# Patient Record
Sex: MALE | Race: WHITE | NOT HISPANIC OR LATINO | Employment: FULL TIME | URBAN - METROPOLITAN AREA
[De-identification: names, ages, dates, MRNs, and addresses within clinical notes are randomized per-mention and may not be internally consistent; named-entity substitution may affect disease eponyms.]

---

## 2024-02-01 ENCOUNTER — LAB (OUTPATIENT)
Dept: LAB | Facility: LAB | Age: 44
End: 2024-02-01
Payer: COMMERCIAL

## 2024-02-01 ENCOUNTER — OFFICE VISIT (OUTPATIENT)
Dept: PRIMARY CARE | Facility: CLINIC | Age: 44
End: 2024-02-01
Payer: COMMERCIAL

## 2024-02-01 ENCOUNTER — APPOINTMENT (OUTPATIENT)
Dept: RADIOLOGY | Facility: CLINIC | Age: 44
End: 2024-02-01
Payer: COMMERCIAL

## 2024-02-01 VITALS
HEIGHT: 68 IN | WEIGHT: 248.1 LBS | BODY MASS INDEX: 37.6 KG/M2 | DIASTOLIC BLOOD PRESSURE: 87 MMHG | OXYGEN SATURATION: 98 % | SYSTOLIC BLOOD PRESSURE: 137 MMHG | HEART RATE: 76 BPM | TEMPERATURE: 96.8 F

## 2024-02-01 DIAGNOSIS — R10.31 ACUTE RIGHT LOWER QUADRANT PAIN: ICD-10-CM

## 2024-02-01 DIAGNOSIS — R10.31 ACUTE RIGHT LOWER QUADRANT PAIN: Primary | ICD-10-CM

## 2024-02-01 PROBLEM — I10 BENIGN ESSENTIAL HYPERTENSION: Status: ACTIVE | Noted: 2024-02-01

## 2024-02-01 PROBLEM — E78.1 HYPERTRIGLYCERIDEMIA: Status: ACTIVE | Noted: 2024-02-01

## 2024-02-01 PROBLEM — R73.01 IMPAIRED FASTING GLUCOSE: Status: ACTIVE | Noted: 2024-02-01

## 2024-02-01 PROBLEM — G47.10 HYPERSOMNIA: Status: ACTIVE | Noted: 2024-02-01

## 2024-02-01 LAB
ALBUMIN SERPL BCP-MCNC: 5 G/DL (ref 3.4–5)
ALP SERPL-CCNC: 99 U/L (ref 33–120)
ALT SERPL W P-5'-P-CCNC: 70 U/L (ref 10–52)
ANION GAP SERPL CALC-SCNC: 15 MMOL/L (ref 10–20)
AST SERPL W P-5'-P-CCNC: 36 U/L (ref 9–39)
BASOPHILS # BLD AUTO: 0.04 X10*3/UL (ref 0–0.1)
BASOPHILS NFR BLD AUTO: 0.6 %
BILIRUB SERPL-MCNC: 0.8 MG/DL (ref 0–1.2)
BUN SERPL-MCNC: 13 MG/DL (ref 6–23)
CALCIUM SERPL-MCNC: 9.9 MG/DL (ref 8.6–10.6)
CHLORIDE SERPL-SCNC: 104 MMOL/L (ref 98–107)
CO2 SERPL-SCNC: 25 MMOL/L (ref 21–32)
CREAT SERPL-MCNC: 0.89 MG/DL (ref 0.5–1.3)
EGFRCR SERPLBLD CKD-EPI 2021: >90 ML/MIN/1.73M*2
EOSINOPHIL # BLD AUTO: 0.17 X10*3/UL (ref 0–0.7)
EOSINOPHIL NFR BLD AUTO: 2.4 %
ERYTHROCYTE [DISTWIDTH] IN BLOOD BY AUTOMATED COUNT: 12.2 % (ref 11.5–14.5)
GLUCOSE SERPL-MCNC: 99 MG/DL (ref 74–99)
HCT VFR BLD AUTO: 48.4 % (ref 41–52)
HGB BLD-MCNC: 16.7 G/DL (ref 13.5–17.5)
IMM GRANULOCYTES # BLD AUTO: 0.03 X10*3/UL (ref 0–0.7)
IMM GRANULOCYTES NFR BLD AUTO: 0.4 % (ref 0–0.9)
LYMPHOCYTES # BLD AUTO: 2.38 X10*3/UL (ref 1.2–4.8)
LYMPHOCYTES NFR BLD AUTO: 33.7 %
MCH RBC QN AUTO: 29.8 PG (ref 26–34)
MCHC RBC AUTO-ENTMCNC: 34.5 G/DL (ref 32–36)
MCV RBC AUTO: 86 FL (ref 80–100)
MONOCYTES # BLD AUTO: 0.66 X10*3/UL (ref 0.1–1)
MONOCYTES NFR BLD AUTO: 9.3 %
NEUTROPHILS # BLD AUTO: 3.79 X10*3/UL (ref 1.2–7.7)
NEUTROPHILS NFR BLD AUTO: 53.6 %
NRBC BLD-RTO: 0 /100 WBCS (ref 0–0)
PLATELET # BLD AUTO: 256 X10*3/UL (ref 150–450)
POTASSIUM SERPL-SCNC: 4.2 MMOL/L (ref 3.5–5.3)
PROT SERPL-MCNC: 7.7 G/DL (ref 6.4–8.2)
RBC # BLD AUTO: 5.6 X10*6/UL (ref 4.5–5.9)
SODIUM SERPL-SCNC: 140 MMOL/L (ref 136–145)
WBC # BLD AUTO: 7.1 X10*3/UL (ref 4.4–11.3)

## 2024-02-01 PROCEDURE — 99213 OFFICE O/P EST LOW 20 MIN: CPT | Performed by: INTERNAL MEDICINE

## 2024-02-01 PROCEDURE — 3079F DIAST BP 80-89 MM HG: CPT | Performed by: INTERNAL MEDICINE

## 2024-02-01 PROCEDURE — 3075F SYST BP GE 130 - 139MM HG: CPT | Performed by: INTERNAL MEDICINE

## 2024-02-01 PROCEDURE — 36415 COLL VENOUS BLD VENIPUNCTURE: CPT

## 2024-02-01 PROCEDURE — 85025 COMPLETE CBC W/AUTO DIFF WBC: CPT

## 2024-02-01 PROCEDURE — 80053 COMPREHEN METABOLIC PANEL: CPT

## 2024-02-01 PROCEDURE — 1036F TOBACCO NON-USER: CPT | Performed by: INTERNAL MEDICINE

## 2024-02-01 ASSESSMENT — ENCOUNTER SYMPTOMS
CONSTIPATION: 0
CHILLS: 0
NAUSEA: 0
FEVER: 0
ABDOMINAL PAIN: 1
COUGH: 0
BACK PAIN: 0
DYSURIA: 0
HEMATURIA: 0
SHORTNESS OF BREATH: 0
ARTHRALGIAS: 0
PALPITATIONS: 0
DIARRHEA: 0
VOMITING: 0

## 2024-02-01 ASSESSMENT — PATIENT HEALTH QUESTIONNAIRE - PHQ9
10. IF YOU CHECKED OFF ANY PROBLEMS, HOW DIFFICULT HAVE THESE PROBLEMS MADE IT FOR YOU TO DO YOUR WORK, TAKE CARE OF THINGS AT HOME, OR GET ALONG WITH OTHER PEOPLE: NOT DIFFICULT AT ALL
1. LITTLE INTEREST OR PLEASURE IN DOING THINGS: SEVERAL DAYS
SUM OF ALL RESPONSES TO PHQ9 QUESTIONS 1 AND 2: 2
2. FEELING DOWN, DEPRESSED OR HOPELESS: SEVERAL DAYS

## 2024-02-01 NOTE — PROGRESS NOTES
"CHIEF COMPLAINT  Pain (Abdomen right side)    HISTORY OF PRESENT ILLNESS  Dean Arthur is a 43 y.o. male presents today for follow up of Pain (Abdomen right side)    HPI    Sunday night, started with crippling RLQ pain.  Today it's not as bad.  Felt like prior diverticulitis.  No fever, chills, nausea, vomiting, diarrhea, constipation.  Did not take anything for it, just drank a lot of water.  No blood in urine, no dysuria.  Slept in chair, just had crippling pain.   Left hemicolectomy in 2022.   Has had normal BM since the pain has started.  Pain is worse with deep breath.  Still has it now, just not as severe.    REVIEW OF SYSTEMS  Review of Systems   Constitutional:  Negative for chills and fever.   Respiratory:  Negative for cough and shortness of breath.    Cardiovascular:  Negative for chest pain, palpitations and leg swelling.   Gastrointestinal:  Positive for abdominal pain. Negative for constipation, diarrhea, nausea and vomiting.   Genitourinary:  Negative for dysuria and hematuria.   Musculoskeletal:  Negative for arthralgias, back pain and gait problem.       ALLERGIES  Patient has no known allergies.    MEDICATIONS  No current outpatient medications    TOBACCO USE  Social History     Tobacco Use   Smoking Status Never   Smokeless Tobacco Never       DEPRESSION SCREEN  Over the past 2 weeks, how often have you been bothered by any of the following problems?  Little interest or pleasure in doing things: Several days  Feeling down, depressed, or hopeless: Several days    SURGICAL HISTORY  Past Surgical History:  2023: CARPAL TUNNEL RELEASE; Bilateral      Comment:  done 6 weeks apart  08/24/2021: OTHER SURGICAL HISTORY      Comment:  Complete colonoscopy  08/30/2022: OTHER SURGICAL HISTORY      Comment:  Laparoscopic left hemicolectomy       OBJECTIVE    /87   Pulse 76   Temp 36 °C (96.8 °F)   Ht 1.727 m (5' 8\")   Wt 113 kg (248 lb 1.6 oz)   SpO2 98%   BMI 37.72 kg/m²    BMI: Estimated " "body mass index is 37.72 kg/m² as calculated from the following:    Height as of this encounter: 1.727 m (5' 8\").    Weight as of this encounter: 113 kg (248 lb 1.6 oz).    BP Readings from Last 3 Encounters:   02/01/24 137/87   11/10/22 140/89   08/30/22 135/88      Wt Readings from Last 3 Encounters:   02/01/24 113 kg (248 lb 1.6 oz)   03/10/23 111 kg (245 lb)   03/02/23 111 kg (244 lb 11.4 oz)        PHYSICAL EXAM  Physical Exam  Constitutional:       Appearance: Normal appearance. He is obese.   Cardiovascular:      Rate and Rhythm: Normal rate and regular rhythm.      Heart sounds: No murmur heard.  Pulmonary:      Effort: No respiratory distress.      Breath sounds: Normal breath sounds. No wheezing.      Comments: Pain in RLQ with deep breath  Abdominal:      General: There is no distension.      Palpations: Abdomen is soft.      Comments: +Tenderness RLQ, no rebound/guarding   Neurological:      General: No focal deficit present.      Mental Status: He is alert and oriented to person, place, and time. Mental status is at baseline.   Psychiatric:         Mood and Affect: Mood normal.         Behavior: Behavior normal.         Thought Content: Thought content normal.         Judgment: Judgment normal.       ASSESSMENT AND PLAN  Assessment/Plan   Problem List Items Addressed This Visit    None  Visit Diagnoses       Acute right lower quadrant pain    -  Primary    Relevant Orders    CBC and Auto Differential    Comprehensive Metabolic Panel    CT abdomen pelvis w IV contrast          Acute RLQ pain - patient states it feels like prior diverticulitis.  Possible causes include appendicitis, diverticulitis, or possible kidney stone.  CT A/P ordered, as well as CBC, CMP.  Follow-up pending results.   "

## 2024-02-02 ENCOUNTER — HOSPITAL ENCOUNTER (OUTPATIENT)
Dept: RADIOLOGY | Facility: HOSPITAL | Age: 44
Discharge: HOME | End: 2024-02-02
Payer: COMMERCIAL

## 2024-02-02 DIAGNOSIS — R10.31 ACUTE RIGHT LOWER QUADRANT PAIN: ICD-10-CM

## 2024-02-02 DIAGNOSIS — K63.89 EPIPLOIC APPENDAGITIS: Primary | ICD-10-CM

## 2024-02-02 PROCEDURE — 74177 CT ABD & PELVIS W/CONTRAST: CPT | Performed by: RADIOLOGY

## 2024-02-02 PROCEDURE — 74177 CT ABD & PELVIS W/CONTRAST: CPT

## 2024-02-02 PROCEDURE — 2550000001 HC RX 255 CONTRASTS: Performed by: INTERNAL MEDICINE

## 2024-02-02 RX ORDER — IBUPROFEN 600 MG/1
600 TABLET ORAL EVERY 8 HOURS
Qty: 21 TABLET | Refills: 0 | Status: SHIPPED | OUTPATIENT
Start: 2024-02-02 | End: 2024-02-09

## 2024-02-02 RX ADMIN — IOHEXOL 75 ML: 350 INJECTION, SOLUTION INTRAVENOUS at 13:11

## 2024-07-11 ENCOUNTER — OFFICE VISIT (OUTPATIENT)
Dept: PRIMARY CARE | Facility: CLINIC | Age: 44
End: 2024-07-11
Payer: COMMERCIAL

## 2024-07-11 VITALS
DIASTOLIC BLOOD PRESSURE: 83 MMHG | TEMPERATURE: 97.2 F | SYSTOLIC BLOOD PRESSURE: 128 MMHG | BODY MASS INDEX: 37.57 KG/M2 | OXYGEN SATURATION: 95 % | HEART RATE: 77 BPM | WEIGHT: 247.9 LBS | HEIGHT: 68 IN

## 2024-07-11 DIAGNOSIS — G89.29 CHRONIC PAIN OF RIGHT KNEE: Primary | ICD-10-CM

## 2024-07-11 DIAGNOSIS — M76.51 PATELLAR TENDINITIS OF RIGHT KNEE: ICD-10-CM

## 2024-07-11 DIAGNOSIS — M25.561 CHRONIC PAIN OF RIGHT KNEE: Primary | ICD-10-CM

## 2024-07-11 PROCEDURE — 3079F DIAST BP 80-89 MM HG: CPT | Performed by: INTERNAL MEDICINE

## 2024-07-11 PROCEDURE — 1036F TOBACCO NON-USER: CPT | Performed by: INTERNAL MEDICINE

## 2024-07-11 PROCEDURE — 3074F SYST BP LT 130 MM HG: CPT | Performed by: INTERNAL MEDICINE

## 2024-07-11 PROCEDURE — 99213 OFFICE O/P EST LOW 20 MIN: CPT | Performed by: INTERNAL MEDICINE

## 2024-07-11 ASSESSMENT — ENCOUNTER SYMPTOMS
ARTHRALGIAS: 1
CHILLS: 0
JOINT SWELLING: 0
FEVER: 0

## 2024-07-11 ASSESSMENT — PATIENT HEALTH QUESTIONNAIRE - PHQ9
1. LITTLE INTEREST OR PLEASURE IN DOING THINGS: NOT AT ALL
2. FEELING DOWN, DEPRESSED OR HOPELESS: NOT AT ALL
SUM OF ALL RESPONSES TO PHQ9 QUESTIONS 1 AND 2: 0
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
## 2024-08-27 ENCOUNTER — TELEPHONE (OUTPATIENT)
Dept: PRIMARY CARE | Facility: CLINIC | Age: 44
End: 2024-08-27
Payer: COMMERCIAL

## 2024-08-27 NOTE — TELEPHONE ENCOUNTER
Pt is calling to say he has a headache, sore throat, last night felt like throat was closing.  Also body aches and tired.  wife said he felt warm.    Started Sunday evening.    He wanted to come in today.......        [id number] - [place], [place] - [address] [place] [address]  [address] [facility] [address]  Phone: [phone] Fax: [phone]

## 2024-08-28 ENCOUNTER — OFFICE VISIT (OUTPATIENT)
Dept: PRIMARY CARE | Facility: CLINIC | Age: 44
End: 2024-08-28
Payer: COMMERCIAL

## 2024-08-28 VITALS
HEART RATE: 78 BPM | HEIGHT: 68 IN | DIASTOLIC BLOOD PRESSURE: 92 MMHG | WEIGHT: 245.2 LBS | BODY MASS INDEX: 37.16 KG/M2 | OXYGEN SATURATION: 98 % | SYSTOLIC BLOOD PRESSURE: 138 MMHG | TEMPERATURE: 97.2 F

## 2024-08-28 DIAGNOSIS — J06.9 UPPER RESPIRATORY TRACT INFECTION, UNSPECIFIED TYPE: ICD-10-CM

## 2024-08-28 DIAGNOSIS — J02.9 PHARYNGITIS, UNSPECIFIED ETIOLOGY: Primary | ICD-10-CM

## 2024-08-28 DIAGNOSIS — G89.29 CHRONIC PAIN OF RIGHT KNEE: ICD-10-CM

## 2024-08-28 DIAGNOSIS — R05.1 ACUTE COUGH: ICD-10-CM

## 2024-08-28 DIAGNOSIS — M76.51 PATELLAR TENDINITIS OF RIGHT KNEE: ICD-10-CM

## 2024-08-28 DIAGNOSIS — M25.561 CHRONIC PAIN OF RIGHT KNEE: ICD-10-CM

## 2024-08-28 DIAGNOSIS — R52 BODY ACHES: ICD-10-CM

## 2024-08-28 LAB
POC BINAX EXPIRATION: NORMAL
POC BINAX NOW COVID SERIAL NUMBER: NORMAL
POC RAPID INFLUENZA A: NEGATIVE
POC RAPID INFLUENZA B: NEGATIVE
POC RAPID STREP: NEGATIVE
POC SARS-COV-2 AG BINAX: NORMAL

## 2024-08-28 PROCEDURE — 1036F TOBACCO NON-USER: CPT | Performed by: NURSE PRACTITIONER

## 2024-08-28 PROCEDURE — 87804 INFLUENZA ASSAY W/OPTIC: CPT | Performed by: NURSE PRACTITIONER

## 2024-08-28 PROCEDURE — 87811 SARS-COV-2 COVID19 W/OPTIC: CPT | Performed by: NURSE PRACTITIONER

## 2024-08-28 PROCEDURE — 87880 STREP A ASSAY W/OPTIC: CPT | Performed by: NURSE PRACTITIONER

## 2024-08-28 PROCEDURE — 3075F SYST BP GE 130 - 139MM HG: CPT | Performed by: NURSE PRACTITIONER

## 2024-08-28 PROCEDURE — 3080F DIAST BP >= 90 MM HG: CPT | Performed by: NURSE PRACTITIONER

## 2024-08-28 PROCEDURE — 3008F BODY MASS INDEX DOCD: CPT | Performed by: NURSE PRACTITIONER

## 2024-08-28 PROCEDURE — 99213 OFFICE O/P EST LOW 20 MIN: CPT | Performed by: NURSE PRACTITIONER

## 2024-08-28 RX ORDER — BENZONATATE 200 MG/1
200 CAPSULE ORAL 3 TIMES DAILY PRN
Qty: 42 CAPSULE | Refills: 0 | Status: SHIPPED | OUTPATIENT
Start: 2024-08-28 | End: 2024-09-27

## 2024-08-28 ASSESSMENT — ENCOUNTER SYMPTOMS
CHILLS: 0
FEVER: 0
SINUS PAIN: 0
SORE THROAT: 1
FACIAL SWELLING: 0
DIARRHEA: 0
COUGH: 1
DIAPHORESIS: 0
RHINORRHEA: 0
CONSTIPATION: 0
FATIGUE: 1
ADENOPATHY: 1
APPETITE CHANGE: 1
VOMITING: 0
ACTIVITY CHANGE: 0
SHORTNESS OF BREATH: 0
SINUS PRESSURE: 0
NAUSEA: 0

## 2024-08-28 ASSESSMENT — PATIENT HEALTH QUESTIONNAIRE - PHQ9
SUM OF ALL RESPONSES TO PHQ9 QUESTIONS 1 AND 2: 2
1. LITTLE INTEREST OR PLEASURE IN DOING THINGS: SEVERAL DAYS
10. IF YOU CHECKED OFF ANY PROBLEMS, HOW DIFFICULT HAVE THESE PROBLEMS MADE IT FOR YOU TO DO YOUR WORK, TAKE CARE OF THINGS AT HOME, OR GET ALONG WITH OTHER PEOPLE: SOMEWHAT DIFFICULT
2. FEELING DOWN, DEPRESSED OR HOPELESS: SEVERAL DAYS

## 2024-08-28 NOTE — PATIENT INSTRUCTIONS
Start Mucinex DM for cough and mucous   Start zyrtec daily   Cepacol lozenges/ Chloraseptic spray for sore throat   Tessalon perles as needed for dry bothersome cough    
DISCHARGE

## 2024-08-28 NOTE — PROGRESS NOTES
Chief Complaint  Headache, Sore Throat, and Generalized Body Aches.    History Of Present Illness  Dean Arthur is a 44 y.o. male presents today for follow up of Headache, Sore Throat, and Generalized Body Aches.      Symptoms started Sunday 8/25- headaches, sore throat, bodyaches, productve cough- with green/yellow sputum at times white.     Denies chills, n/v. Denies nasal congestion, ear pain., no rash.     Appetite is decreased.  Drinking adequate fluids     Has tried theraflu at home.       Review of Systems  Review of Systems   Constitutional:  Positive for appetite change and fatigue. Negative for activity change, chills, diaphoresis and fever.   HENT:  Positive for sore throat. Negative for congestion, ear discharge, ear pain, facial swelling, hearing loss, mouth sores, nosebleeds, postnasal drip, rhinorrhea, sinus pressure and sinus pain.    Respiratory:  Positive for cough. Negative for shortness of breath.    Cardiovascular:  Negative for chest pain.   Gastrointestinal:  Negative for constipation, diarrhea, nausea and vomiting.   Hematological:  Positive for adenopathy (submandibular).       Past Medical History  He has a past medical history of Elevation of levels of liver transaminase levels (05/26/2015), Encounter for examination of ears and hearing without abnormal findings (01/26/2018), Encounter for immunization (12/23/2015), Laceration without foreign body of right middle finger without damage to nail, subsequent encounter (04/06/2020), Other conditions influencing health status (06/12/2019), Other specified anxiety disorders (01/12/2016), Other specified health status (01/12/2016), Pain in left shoulder (07/03/2019), Pain in unspecified shoulder, Personal history of diseases of the skin and subcutaneous tissue (04/05/2018), Personal history of other (healed) physical injury and trauma (02/24/2021), Personal history of other diseases of the digestive system (11/11/2021), Personal history of  "other diseases of the musculoskeletal system and connective tissue, Personal history of other diseases of the musculoskeletal system and connective tissue (05/26/2015), Personal history of other diseases of the musculoskeletal system and connective tissue (07/22/2015), Personal history of other diseases of the musculoskeletal system and connective tissue (06/19/2019), Personal history of other diseases of the musculoskeletal system and connective tissue (05/14/2015), Personal history of other diseases of the respiratory system (12/08/2017), Personal history of other specified conditions (08/04/2015), Shortness of breath (03/16/2017), and Unspecified hearing loss, bilateral (01/18/2018).    Surgical History  He has a past surgical history that includes Other surgical history (08/24/2021); Other surgical history (08/30/2022); and Carpal tunnel release (Bilateral, 2023).    Family History  Family History   Problem Relation Name Age of Onset    Other (glioblastoma multiforme) Mother      Bell's palsy Father      Diabetes type II Father      Gout Father      Hypertension Father      Heart attack Father          Social History  He reports that he has never smoked. He has never used smokeless tobacco. No history on file for alcohol use and drug use.    DEPRESSION SCREEN  Over the past 2 weeks, how often have you been bothered by any of the following problems?  Little interest or pleasure in doing things: Several days  Feeling down, depressed, or hopeless: Several days      Allergies  Patient has no known allergies.    Medications  Current Outpatient Medications   Medication Instructions    benzonatate (TESSALON) 200 mg, oral, 3 times daily PRN, Do not crush or chew.        Objective   BP (!) 138/92   Pulse 78   Temp 36.2 °C (97.2 °F)   Ht 1.727 m (5' 8\")   Wt 111 kg (245 lb 3.2 oz)   SpO2 98%   BMI 37.28 kg/m²    BMI: Estimated body mass index is 37.28 kg/m² as calculated from the following:    Height as of this " "encounter: 1.727 m (5' 8\").    Weight as of this encounter: 111 kg (245 lb 3.2 oz).    Physical Exam  Physical Exam  Constitutional:       Appearance: Normal appearance.   HENT:      Head: Normocephalic.      Mouth/Throat:      Mouth: Mucous membranes are moist.      Pharynx: Posterior oropharyngeal erythema present. No oropharyngeal exudate or uvula swelling.   Neurological:      Mental Status: He is alert.         Relevant Results and Imaging  Lab on 02/01/2024   Component Date Value Ref Range Status    WBC 02/01/2024 7.1  4.4 - 11.3 x10*3/uL Final    nRBC 02/01/2024 0.0  0.0 - 0.0 /100 WBCs Final    RBC 02/01/2024 5.60  4.50 - 5.90 x10*6/uL Final    Hemoglobin 02/01/2024 16.7  13.5 - 17.5 g/dL Final    Hematocrit 02/01/2024 48.4  41.0 - 52.0 % Final    MCV 02/01/2024 86  80 - 100 fL Final    MCH 02/01/2024 29.8  26.0 - 34.0 pg Final    MCHC 02/01/2024 34.5  32.0 - 36.0 g/dL Final    RDW 02/01/2024 12.2  11.5 - 14.5 % Final    Platelets 02/01/2024 256  150 - 450 x10*3/uL Final    Neutrophils % 02/01/2024 53.6  40.0 - 80.0 % Final    Immature Granulocytes %, Automated 02/01/2024 0.4  0.0 - 0.9 % Final    Immature Granulocyte Count (IG) includes promyelocytes, myelocytes and metamyelocytes but does not include bands. Percent differential counts (%) should be interpreted in the context of the absolute cell counts (cells/UL).    Lymphocytes % 02/01/2024 33.7  13.0 - 44.0 % Final    Monocytes % 02/01/2024 9.3  2.0 - 10.0 % Final    Eosinophils % 02/01/2024 2.4  0.0 - 6.0 % Final    Basophils % 02/01/2024 0.6  0.0 - 2.0 % Final    Neutrophils Absolute 02/01/2024 3.79  1.20 - 7.70 x10*3/uL Final    Percent differential counts (%) should be interpreted in the context of the absolute cell counts (cells/uL).    Immature Granulocytes Absolute, Au* 02/01/2024 0.03  0.00 - 0.70 x10*3/uL Final    Lymphocytes Absolute 02/01/2024 2.38  1.20 - 4.80 x10*3/uL Final    Monocytes Absolute 02/01/2024 0.66  0.10 - 1.00 x10*3/uL Final    " Eosinophils Absolute 02/01/2024 0.17  0.00 - 0.70 x10*3/uL Final    Basophils Absolute 02/01/2024 0.04  0.00 - 0.10 x10*3/uL Final    Glucose 02/01/2024 99  74 - 99 mg/dL Final    Sodium 02/01/2024 140  136 - 145 mmol/L Final    Potassium 02/01/2024 4.2  3.5 - 5.3 mmol/L Final    Chloride 02/01/2024 104  98 - 107 mmol/L Final    Bicarbonate 02/01/2024 25  21 - 32 mmol/L Final    Anion Gap 02/01/2024 15  10 - 20 mmol/L Final    Urea Nitrogen 02/01/2024 13  6 - 23 mg/dL Final    Creatinine 02/01/2024 0.89  0.50 - 1.30 mg/dL Final    eGFR 02/01/2024 >90  >60 mL/min/1.73m*2 Final    Calculations of estimated GFR are performed using the 2021 CKD-EPI Study Refit equation without the race variable for the IDMS-Traceable creatinine methods.  https://jasn.asnjournals.org/content/early/2021/09/22/ASN.2535291609    Calcium 02/01/2024 9.9  8.6 - 10.6 mg/dL Final    Albumin 02/01/2024 5.0  3.4 - 5.0 g/dL Final    Alkaline Phosphatase 02/01/2024 99  33 - 120 U/L Final    Total Protein 02/01/2024 7.7  6.4 - 8.2 g/dL Final    AST 02/01/2024 36  9 - 39 U/L Final    Bilirubin, Total 02/01/2024 0.8  0.0 - 1.2 mg/dL Final    ALT 02/01/2024 70 (H)  10 - 52 U/L Final    Patients treated with Sulfasalazine may generate falsely decreased results for ALT.     No images are attached to the encounter.        Assessment and Plan  Assessment/Plan   Problem List Items Addressed This Visit             ICD-10-CM    Right knee pain M25.561    Relevant Orders    Referral to Orthopaedic Surgery    Patellar tendinitis of right knee M76.51    Relevant Orders    Referral to Orthopaedic Surgery     Other Visit Diagnoses         Codes    Pharyngitis, unspecified etiology    -  Primary J02.9    Relevant Orders    POCT BinaxNOW Covid-19 Ag Card manually resulted    POCT rapid strep A manually resulted    Flu vaccine, trivalent, preservative free, age 6 months and greater (Fluraix/Fluzone/Flulaval)    Acute cough     R05.1    Relevant Medications     benzonatate (Tessalon) 200 mg capsule    Other Relevant Orders    Flu vaccine, trivalent, preservative free, age 6 months and greater (Fluraix/Fluzone/Flulaval)    Upper respiratory tract infection, unspecified type     J06.9    Relevant Orders    Flu vaccine, trivalent, preservative free, age 6 months and greater (Fluraix/Fluzone/Flulaval)    Body aches     R52    Relevant Orders    Flu vaccine, trivalent, preservative free, age 6 months and greater (Fluraix/Fluzone/Flulaval)             Start Mucinex DM for cough and mucous   Start zyrtec daily   Cepacol lozenges/ Chloraseptic spray for sore throat   Tessalon perles as needed for dry bothersome cough

## 2024-09-17 ENCOUNTER — HOSPITAL ENCOUNTER (OUTPATIENT)
Dept: RADIOLOGY | Facility: CLINIC | Age: 44
Discharge: HOME | End: 2024-09-17
Payer: COMMERCIAL

## 2024-09-17 ENCOUNTER — OFFICE VISIT (OUTPATIENT)
Dept: ORTHOPEDIC SURGERY | Facility: CLINIC | Age: 44
End: 2024-09-17
Payer: COMMERCIAL

## 2024-09-17 DIAGNOSIS — M54.16 LUMBAR RADICULOPATHY: ICD-10-CM

## 2024-09-17 DIAGNOSIS — M76.51 PATELLAR TENDINITIS OF RIGHT KNEE: ICD-10-CM

## 2024-09-17 DIAGNOSIS — M25.561 CHRONIC PAIN OF RIGHT KNEE: ICD-10-CM

## 2024-09-17 DIAGNOSIS — G89.29 CHRONIC PAIN OF RIGHT KNEE: ICD-10-CM

## 2024-09-17 PROCEDURE — 99213 OFFICE O/P EST LOW 20 MIN: CPT | Performed by: ORTHOPAEDIC SURGERY

## 2024-09-17 PROCEDURE — 72120 X-RAY BEND ONLY L-S SPINE: CPT

## 2024-09-17 PROCEDURE — 1036F TOBACCO NON-USER: CPT | Performed by: ORTHOPAEDIC SURGERY

## 2024-09-17 PROCEDURE — 99214 OFFICE O/P EST MOD 30 MIN: CPT | Performed by: ORTHOPAEDIC SURGERY

## 2024-09-17 RX ORDER — METHYLPREDNISOLONE 4 MG/1
TABLET ORAL
Qty: 21 TABLET | Refills: 0 | Status: SHIPPED | OUTPATIENT
Start: 2024-09-17

## 2024-09-17 RX ORDER — MELOXICAM 15 MG/1
15 TABLET ORAL
Qty: 30 TABLET | Refills: 1 | Status: SHIPPED | OUTPATIENT
Start: 2024-09-17

## 2024-09-17 NOTE — PROGRESS NOTES
History of Present Illness:   Patient presents today for evaluation of right posterior hip, buttock pain.  Patient also endorses some mild low back pain.  Patient states that he also has some anterior numbness along the knee that is been present for about 3 years.  There is no recent trauma or any significant history of lumbar spine issues.  The patient does not endorse any significant groin or anterior hip pain.  The pain is described as episodic severe and does radiate down the leg the buttock posteriorly.  Regarding prior treatments: Rest, ice, anti-inflammatories as well as physical therapy for the knee.    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    Review of Systems   GENERAL: Negative  GI: Negative  MUSCULOSKELETAL: See HPI  SKIN: Negative  NEURO:  Negative    Physical Examination:  Right Hip:  Normal gait  Negative Trendelenburg sign  Skin healthy and intact  No tenderness to palpation over lumbar spine  No tenderness over greater trochanter    Full forward flexion  Symmetric motion, no loss of internal rotation   No weakness with resisted hip flexion, abduction or adduction    Negative flexion/adduction/internal rotation  Negative flexion/abduction/external rotation test  For the patient over the anterior knee compared to contralateral side.  Pain with straight leg raise    Motor exam: Gross strength intact knee flexion extension, hip flexion extension, ankle dorsiflexion plantarflexion without any obvious deficits  Sensation intact L2-S1  No upper motor neuron signs    Brief examination of right knee was within normal limits without abnormality.    Imaging:  Plain films right knee reveal no acute abnormality, well-preserved joint spaces.    Assessment:   Patient with right lumbar radiculopathy, possibly upper lumbar spine given distribution on today's examination.  There is some palpable loss of sensation    Plan:  We reviewed the disease process with the patient we  discussed a variety of treatment options.  We reviewed the importance of physical therapy including home exercises for core stretching and stability, hamstring flexibility etc.  We reviewed oral medications including NSAIDs risks and benefits, Medrol Dosepak, muscle relaxers.    We discussed treatment options ranging from nonoperative, physical therapy medications, image guided injections, surgical intervention etc.    We will refer to the spine team for evaluation.  We highly encouraged physical therapy for the lower back, Medrol Dosepak followed by meloxicam as needed.  We do feel that he has a upper lumbar spine problem and could benefit from dedicated images.    Jak Jacome PA-C     In a face to face encounter, I evaluated the patient and performed a physical examination, discussed pertinent diagnostic studies if indicated and discussed diagnosis and management strategies with both the patient and physician assistant / nurse practitioner.  I reviewed the PA/NP's note and agree with the documented findings and plan of care.    Patient with concern for lumbar radiculopathy, plain radiographs demonstrate foraminal stenosis L5-S1.    Anti-inflammatories, Medrol pack followed by NSAIDs.  Formal therapy follow-up with spine.    Bernardo Horner MD

## 2024-09-24 ENCOUNTER — APPOINTMENT (OUTPATIENT)
Dept: ORTHOPEDIC SURGERY | Facility: CLINIC | Age: 44
End: 2024-09-24
Payer: COMMERCIAL

## 2024-09-24 DIAGNOSIS — M54.16 LUMBAR RADICULOPATHY: Primary | ICD-10-CM

## 2024-09-24 PROCEDURE — 1036F TOBACCO NON-USER: CPT | Performed by: PHYSICIAN ASSISTANT

## 2024-09-24 PROCEDURE — 99214 OFFICE O/P EST MOD 30 MIN: CPT | Performed by: PHYSICIAN ASSISTANT

## 2024-09-24 NOTE — PROGRESS NOTES
New patient to us established to the practice comes the office complaining of some right-sided intermittent sciatica symptoms.  But numbness and tingling and pain to the right knee.  But she feels it on a plane for long periods and he does a lot of traveling.  We going on for maybe about 3 years.  He saw one of our knee specialist does not think it is a knee issue.  He denies bowel or bladder complaints.  Not much and really in the way of back pain.  No saddle anesthesia no gait or balance changes no trauma accidents or fall to cause it no fevers chills nausea vomiting or night sweats.  No spine surgeries in the past.  He said over-the-counter meds prescription meds and did physical therapy.  And still having the symptoms.    Looked at patient's recent blood work.  Checked a metabolic panel glucose 99 sodium 140 potassium 4.2 we looked at hemoglobin A1c which was 5.5.  We also looked at primary doctors note check medication list see if he is on a statin type medication.  I do not see that he is.  I see he was given a Medrol Dosepak and some Mobic.    Physical exam: Well-nourished, well kept.  No lymphangitis or lymphadenopathy in the examined extremities.  Good perfusion to the extremities ×4.  Radial and dorsalis pedis pulses 2+.  Capillary refill to all 4 digits brisk.  No distal edema x 4.  Gait normal.  Can walk on heels and toes.  No issues with the cervical spine full range of motion no abnormalities.  Upper extremities had full range of motion with no abnormalities no pain or complaints.  Motor and sensory neurologically intact.  Examination of the back reveals no swelling, step-off, or point tenderness.  Range of motion with flexion, extension, side bending and rotation is well maintained without crepitance, instability, or exacerbation of pain.  Strength is within normal limits.  Examination of the lower extremities reveals no point tenderness, swelling, or deformity.  Range of motion of the hips, knees,  and ankles are full without crepitance, instability, or exacerbation of pain.  Strength is 5/5 throughout.  No redness, abrasions, or lesions on all 4 extremities, head and neck, or trunk.  Gross sensation intact in the extremities ×4.  Deep tendon reflexes 2+ and symmetric bilaterally.  Sonja, clonus, and Babinski were negative.  Straight leg raise negative.  Affect normal.  Alert and oriented ×3.  Coordination normal.    X-ray: Looked at patient's lumbar x-rays.  Showing degenerative disc at the L5-S1 but not that bad.  No obvious fractures dislocations or bony lytic lesions.  X-rays of the knee that were taken prior reviewed reports reviewed as well showing not much in the way of arthritic degenerative changes.    Assessment: This a patient with not much in the way back pain but he is get some sciatica symptoms intermittent.  Some knee pain that bothers him.  Also with numbness and tingling to the knee region.  I explained to him this may be coming from the nerves in his back.  Should be evaluated.  He said physical therapy over-the-counter meds or prescription meds not helping.    Plan: We will obtain an MRI lumbar spine at [facility] for diagnostic purposes.  He will follow-up after those films

## 2024-09-30 ENCOUNTER — EVALUATION (OUTPATIENT)
Dept: PHYSICAL THERAPY | Facility: CLINIC | Age: 44
End: 2024-09-30
Payer: COMMERCIAL

## 2024-09-30 DIAGNOSIS — M76.51 PATELLAR TENDINITIS OF RIGHT KNEE: ICD-10-CM

## 2024-09-30 DIAGNOSIS — G89.29 CHRONIC PAIN OF RIGHT KNEE: ICD-10-CM

## 2024-09-30 DIAGNOSIS — M25.561 CHRONIC PAIN OF RIGHT KNEE: ICD-10-CM

## 2024-09-30 DIAGNOSIS — M54.16 LUMBAR RADICULOPATHY: ICD-10-CM

## 2024-09-30 PROCEDURE — 97140 MANUAL THERAPY 1/> REGIONS: CPT | Mod: GP | Performed by: PHYSICAL THERAPIST

## 2024-09-30 PROCEDURE — 97110 THERAPEUTIC EXERCISES: CPT | Mod: GP | Performed by: PHYSICAL THERAPIST

## 2024-09-30 PROCEDURE — 97161 PT EVAL LOW COMPLEX 20 MIN: CPT | Mod: GP | Performed by: PHYSICAL THERAPIST

## 2024-09-30 ASSESSMENT — PATIENT HEALTH QUESTIONNAIRE - PHQ9
1. LITTLE INTEREST OR PLEASURE IN DOING THINGS: NOT AT ALL
SUM OF ALL RESPONSES TO PHQ9 QUESTIONS 1 AND 2: 0
2. FEELING DOWN, DEPRESSED OR HOPELESS: NOT AT ALL

## 2024-09-30 ASSESSMENT — ENCOUNTER SYMPTOMS
OCCASIONAL FEELINGS OF UNSTEADINESS: 0
DEPRESSION: 0
LOSS OF SENSATION IN FEET: 0

## 2024-09-30 NOTE — PROGRESS NOTES
Physical Therapy    Physical Therapy Evaluation    Patient Name: Dean Arthur  MRN: [id number]  Today's Date: 9/30/2024  Visit:1  Referred by:Bernardo Horner  Referred for:  Current Problem  Problem List Items Addressed This Visit             ICD-10-CM    Right knee pain M25.561    Patellar tendinitis of right knee M76.51     Other Visit Diagnoses         Codes    Lumbar radiculopathy     M54.16    Relevant Orders    Follow Up In Physical Therapy          General     Payor: MEDICAL [facility] / Plan: [employer]ICAL [employer] / Product Type: *No Product type* /   Time Calculation  Start Time: 1019  Stop Time: 1100  Time Calculation (min): 41 min  PT Evaluation Time Entry  PT Evaluation (Low) Time Entry: 16  PT Therapeutic Procedures Time Entry  Manual Therapy Time Entry: 10  Therapeutic Exercise Time Entry: 15          SUBJECTIVE  Precautions  Precautions  STEADI Fall Risk Score (The score of 4 or more indicates an increased risk of falling): 0     Pain       Chief complaint: Pt reports symptoms began 3-5 years.  After sitting for more than 20 min his knee gets a painful numb. Hurts all around the knee. Also pain in the R buttock back of thigh and lateral lower leg to ankle. 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     Pain       Pain ranges:  7/10  Increases with: sitting for long periods  Decreases with: getting up  Prior level of function: Ind with ADLs  Current functional level:  Pain limits the patient's ability to sit during flights for work.   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   Pt's stated goal: reduce pain and improve function  Pertinent PMH:none    Lower Extremity Strength:  Date: EVAL      Myotome RIGHT LEFT   Hip Flexion L1,2 4+ 4+   Knee Extension L3 4+ 4+   Knee Flexion S2 4+ 4+   Ankle DF L4 4+ 4+   Great toe Ext L5 4+ 4+     Lumbar ROM:  Date: EVAL     Percentage    Flexion 75%    Extension 25% asymmetrical     RIGHT LEFT   Side Bend     Rotation        Joint mobility mod decreased lumbar PAs  Posture WNL  Palpation no TTP in SI, lumbar, R hip, R IT band or R knee today  Neurological symptoms pain in L 4-5 dermatone but not today    Special tests:  Date EVAL   LUMBAR    Slump -   SLR -   Crossed SLR -     Outcome Measures:  Other Measures  Oswestry Disablity Index (LUIS): 16%        ASSESSMENT:  The pt presents with signs and symptoms consistent with the medical diagnosis of  lumbar radiculopathy.    The pt has impairments including decreased ROM, flexibility, joint mobility, and pain.  The pt demonstrates functional limitations which include difficulty with  sitting, job requirements, and driving.  The pt will benefit from skilled physical therapy to reduce impairments in order to return to prior level of function. The pt has tried therapy for his knee at [facility].     The physical therapy prognosis is good for the patient to achieve their goals.   The pt tolerated therapy treatment today well with no adverse effects.  Personal factors that impact therapy include:   none  Clinical presentation: Stable and Predictable   Level of clinical decision making is Low    PLAN  The pt will be seen 1 days a week for 4-8 weeks.        The pt has been educated about the risks and benefits of physical therapy including manual therapy treatments and gives consent for treatment.     The patient will benefit from physical therapy treatment to include: therapeutic exercises, therapeutic activities, neurological re-education, manual therapy, modalities, and a home exercise program.     The patient's potential to reach their therapy goals is excellent.     The evaluating therapist is prn and therefore the pt's POC may be re-assessed or discharged by another staff therapist at this location based on scheduling and availability.  Pt is aware and agrees.     TREATMENT:    Therapeutic ex:  Access Code: [email or web address]  URL: [email or web address]  Date:  09/30/2024  Prepared by: José Manuel Quick    Exercises  - Lying Prone  - 1 x daily - 7 x weekly - 3 sets - 1 reps - 2 min hold  - Static Prone on Elbows  - 1 x daily - 7 x weekly - 3 sets - 1 reps - 2 min hold  - Prone Press Up  - 1 x daily - 7 x weekly - 3 sets - 1 reps - 2 min  hold  - Standing Lumbar Extension with Counter  - 1 x daily - 7 x weekly - 3 sets - 10 reps  - Sidelying Open Book Thoracic Lumbar Rotation and Extension  - 1 x daily - 7 x weekly - 1 sets - 10 reps    **NV: review extensions for improvement in symptoms continue with core and hip strength, HS and PSOAS strength    Manual:  Pt educated about manual therapy risks and benefits.  Pt given opportunity to stop if needed.  Pt aware and agrees. No adverse effects were noted. For assessment purposes during this evaluation.  Lumbar PA's, thoracic PAS, LAD R/L  Goals:  Active       PT Problem       Pt will have pain no higher than 2/10 for at least 2 weeks        Start:  09/30/24    Expected End:  12/29/24            Pt will have AROM of lumbar spine extension to at least 75% symmetrically to allow the pt to sit without pain         Start:  09/30/24    Expected End:  12/29/24            Oswestry to 8% or better       Start:  09/30/24    Expected End:  12/29/24            Pt will be compliant with lumbar roll and posture to centralize pain       Start:  09/30/24    Expected End:  12/29/24                no rash, CP, SOB/no fever

## 2024-10-08 ENCOUNTER — APPOINTMENT (OUTPATIENT)
Dept: PHYSICAL THERAPY | Facility: CLINIC | Age: 44
End: 2024-10-08
Payer: COMMERCIAL

## 2024-10-14 ENCOUNTER — DOCUMENTATION (OUTPATIENT)
Dept: PHYSICAL THERAPY | Facility: CLINIC | Age: 44
End: 2024-10-14
Payer: COMMERCIAL

## 2024-10-14 ENCOUNTER — APPOINTMENT (OUTPATIENT)
Dept: PHYSICAL THERAPY | Facility: CLINIC | Age: 44
End: 2024-10-14
Payer: COMMERCIAL

## 2024-10-14 ENCOUNTER — APPOINTMENT (OUTPATIENT)
Dept: RADIOLOGY | Facility: HOSPITAL | Age: 44
End: 2024-10-14
Payer: COMMERCIAL

## 2024-10-14 NOTE — PROGRESS NOTES
Physical Therapy                 Therapy Communication Note    Patient Name: Dean Arthur  MRN: [id number]  Today's Date: 10/14/2024     Discipline: Physical Therapy    Missed Visit Reason:  No Show    Missed Time: No Show    Comment: Contacted patient, thought his appointment was tomorrow. Rescheduled to 10/16/2024.

## 2024-10-16 ENCOUNTER — TREATMENT (OUTPATIENT)
Dept: PHYSICAL THERAPY | Facility: CLINIC | Age: 44
End: 2024-10-16
Payer: COMMERCIAL

## 2024-10-16 DIAGNOSIS — M54.16 LUMBAR RADICULOPATHY: ICD-10-CM

## 2024-10-16 PROCEDURE — 97140 MANUAL THERAPY 1/> REGIONS: CPT | Mod: GP

## 2024-10-16 PROCEDURE — 97110 THERAPEUTIC EXERCISES: CPT | Mod: GP

## 2024-10-16 NOTE — PROGRESS NOTES
"Physical Therapy    Physical Therapy Treatment    Patient Name: Dean Arthur  MRN: [id number]  Today's Date: 10/16/2024  Time Calculation  Start Time: 1002  Stop Time: 1045  Time Calculation (min): 43 min     PT Therapeutic Procedures Time Entry  Manual Therapy Time Entry: 8  Therapeutic Exercise Time Entry: 35        Payor: MEDICAL [employer] [facility] / Plan: [employer] / Product Type: *No Product type* /   Visit # 2      Assessment:  Patient returns for first follow up visit. Abolished symptoms with therapeutic exercises. Encouraged increased frequency of HEP to maximize therapy benefits and reduce symptoms, especially with upcoming travel for work duties.    Plan:  Patient traveling out of town for the next month. Plan to hold chart and he plans to reschedule upon return to continue PT POC.    Current Problem  1. Lumbar radiculopathy  Follow Up In Physical Therapy        Problem List Items Addressed This Visit    None  Visit Diagnoses         Codes    Lumbar radiculopathy     M54.16            General   **per initial evaluation 09/30/2024: \"The pt presents with signs and symptoms consistent with the medical diagnosis of  lumbar radiculopathy.\"      Subjective    HEP 3x since last visit  Sitting, driving >20 mins increases symptoms  Travels for work and spends 14-16 hours    Pain  3/10 R knee numb pain    Objective     Treatments:  Therapeutic Exercise: Education on posture, positioning with use of lumbar roll, modification or discontinuation of any exercise that increases symptoms (i.e.peripheralization of symptoms).  Lying Prone   Static Prone on Elbows  Prone Press Up on elbow repeated   Standing Lumbar Extension with/without counter support  Off mat hip flexor stretch/R LE extension   Glute bridge  Seated anterior pelvic tilt with lumbar roll  Prone hip extension   Standing scapular retractions with TA, blue TB  Pull downs with blue TB  Not today:  Sidelying Open Book Thoracic Lumbar Rotation and " Extension    Manual:   Lumbar CPA's, thoracic  T9-T12    OP EDUCATION: verbal, demonstration, HEP handout    Access Code: [email or web address]  URL: [email or web address]  Date: 10/16/2024  Prepared by: Isabel Negrete  - Supine Quadriceps Stretch with Strap on Table  - 3 reps - 30 seconds hold  - Supine Bridge  - 2 sets - 10 reps  - Prone Hip Extension  - 2 sets - 10 reps    Access Code: [email or web address]  URL: [email or web address]  Date: 09/30/2024  Prepared by: José Manuel Quick       Goals:  Active       PT Problem       Pt will have pain no higher than 2/10 for at least 2 weeks        Start:  09/30/24    Expected End:  12/29/24            Pt will have AROM of lumbar spine extension to at least 75% symmetrically to allow the pt to sit without pain         Start:  09/30/24    Expected End:  12/29/24            Oswestry to 8% or better       Start:  09/30/24    Expected End:  12/29/24            Pt will be compliant with lumbar roll and posture to centralize pain       Start:  09/30/24    Expected End:  12/29/24

## 2024-10-21 ENCOUNTER — APPOINTMENT (OUTPATIENT)
Dept: PHYSICAL THERAPY | Facility: CLINIC | Age: 44
End: 2024-10-21
Payer: COMMERCIAL

## 2024-11-06 ENCOUNTER — APPOINTMENT (OUTPATIENT)
Dept: RADIOLOGY | Facility: CLINIC | Age: 44
End: 2024-11-06
Payer: COMMERCIAL

## 2025-02-10 ENCOUNTER — DOCUMENTATION (OUTPATIENT)
Dept: PHYSICAL THERAPY | Facility: CLINIC | Age: 45
End: 2025-02-10
Payer: COMMERCIAL

## 2025-02-10 NOTE — PROGRESS NOTES
Physical Therapy    Discharge Summary    Name: Dean Arthur  MRN: [id number]  : 1980  Date: 02/10/25    Discharge Summary: PT    Discharge Information: Date of discharge 02/10/2025, Date of last visit 10/16/2024, Date of evaluation 2024, Number of attended visits 2, Referred by Bernardo Horner, and Referred for lumbar radiculopathy    Discharge Status: Current status unknown. No further PT visits attended after 10/16/2024. See last daily note for last known status.    Rehab Discharge Reason: Failed to schedule and/or keep follow-up appointment(s)